# Patient Record
Sex: MALE | Race: WHITE | NOT HISPANIC OR LATINO | Employment: FULL TIME | URBAN - METROPOLITAN AREA
[De-identification: names, ages, dates, MRNs, and addresses within clinical notes are randomized per-mention and may not be internally consistent; named-entity substitution may affect disease eponyms.]

---

## 2024-10-24 ENCOUNTER — OFFICE VISIT (OUTPATIENT)
Dept: URGENT CARE | Facility: CLINIC | Age: 24
End: 2024-10-24
Payer: COMMERCIAL

## 2024-10-24 ENCOUNTER — APPOINTMENT (OUTPATIENT)
Dept: RADIOLOGY | Facility: CLINIC | Age: 24
End: 2024-10-24
Payer: COMMERCIAL

## 2024-10-24 VITALS
BODY MASS INDEX: 28.25 KG/M2 | WEIGHT: 186.4 LBS | HEIGHT: 68 IN | TEMPERATURE: 98 F | RESPIRATION RATE: 18 BRPM | OXYGEN SATURATION: 98 % | HEART RATE: 78 BPM

## 2024-10-24 DIAGNOSIS — S69.91XA INJURY OF RIGHT INDEX FINGER, INITIAL ENCOUNTER: ICD-10-CM

## 2024-10-24 DIAGNOSIS — S63.690A OTHER SPRAIN OF RIGHT INDEX FINGER, INITIAL ENCOUNTER: Primary | ICD-10-CM

## 2024-10-24 PROCEDURE — 73140 X-RAY EXAM OF FINGER(S): CPT

## 2024-10-24 PROCEDURE — G0382 LEV 3 HOSP TYPE B ED VISIT: HCPCS | Performed by: ORTHOPAEDIC SURGERY

## 2024-10-24 NOTE — PATIENT INSTRUCTIONS
Cornelius tape when active  Pain relief:   Tylenol   Motrin   Ice  Referral to orthopedics and PT provided if symptoms fail to improve  Follow up with PCP in 3-5 days.  Proceed to  ER if symptoms worsen.

## 2024-10-24 NOTE — PROGRESS NOTES
[facility]        NAME: Jaswant Saldaña is a 24 y.o. male  : 2000    MRN: [id number]  DATE: 2024  TIME: 3:30 PM    Assessment and Plan   Other sprain of right index finger, initial encounter [S63.690A]  1. Other sprain of right index finger, initial encounter  Ambulatory Referral to Orthopedic Surgery    Ambulatory Referral to Physical Therapy      2. Injury of right index finger, initial encounter  XR finger right second digit-index        XR of the right index finger reviewed and discussed with the patient, which shows no evidence of fracture or dislocation.     History and exam consistent with a sprain of the radial collateral ligament. Provided cornelius tape, advised patient to follow up with orthopedics and/or PT if symptoms fail to improve.     Patient Instructions     Cornelius tape when active  Pain relief:   Tylenol   Motrin   Ice  Referral to orthopedics and PT provided if symptoms fail to improve  Follow up with PCP in 3-5 days.  Proceed to  ER if symptoms worsen.    If tests are performed, our office will contact you with results only if changes need to made to the care plan discussed with you at the visit. You can review your full results on [facility].    Chief Complaint     Chief Complaint   Patient presents with    Finger Injury     Injured his Right pointer finger while cracking his knuckles happened a month ago          History of Present Illness       24 YOM presents to the urgent care for evaluation of right index finger pain. The pain started about a month ago after he cracked his knuckle. He complains of pain along the radial aspect of the PIP joint that occurs if he bends the finger a certain way. He denies any numbness or tingling.         Review of Systems   Review of Systems   Constitutional:  Negative for chills and fever.   HENT:  Negative for ear pain and sore throat.    Eyes:  Negative for pain and visual disturbance.   Respiratory:  Negative for cough and  "shortness of breath.    Cardiovascular:  Negative for chest pain and palpitations.   Gastrointestinal:  Negative for abdominal pain, diarrhea, nausea and vomiting.   Genitourinary:  Negative for dysuria and hematuria.   Musculoskeletal:  Positive for arthralgias. Negative for back pain.   Skin:  Negative for color change and rash.   Neurological:  Negative for dizziness, seizures, syncope, light-headedness and headaches.   Psychiatric/Behavioral: Negative.     All other systems reviewed and are negative.        Current Medications     No current outpatient medications on file.    Current Allergies     Allergies as of 10/24/2024    (No Known Allergies)            The following portions of the patient's history were reviewed and updated as appropriate: allergies, current medications, past family history, past medical history, past social history, past surgical history and problem list.     History reviewed. No pertinent past medical history.    History reviewed. No pertinent surgical history.    No family history on file.      Medications have been verified.        Objective   Pulse 78   Temp 98 °F (36.7 °C)   Resp 18   Ht 5' 8\" (1.727 m)   Wt 84.6 kg (186 lb 6.4 oz)   SpO2 98%   BMI 28.34 kg/m²        Physical Exam     Physical Exam  Vitals and nursing note reviewed.   Constitutional:       General: He is not in acute distress.     Appearance: Normal appearance. He is not ill-appearing.   HENT:      Head: Normocephalic and atraumatic.      Nose: Nose normal.      Mouth/Throat:      Mouth: Mucous membranes are moist.      Pharynx: Oropharynx is clear.   Eyes:      Extraocular Movements: Extraocular movements intact.      Pupils: Pupils are equal, round, and reactive to light.   Cardiovascular:      Rate and Rhythm: Normal rate.      Pulses: Normal pulses.   Pulmonary:      Effort: Pulmonary effort is normal. No respiratory distress.   Musculoskeletal:      Cervical back: Normal range of motion.      Comments: " Right index finger:  Skin without lesions, ecchymosis, erythema, swelling, warmth. There is tenderness along the radial aspect of the PIP joint. Full ROM of the DIP, PIP, and MCP joints, patient able to make a full composite fist. The PIP joint is stable to radial and ulnar deviation without laxity. There is pain elicited with radial collateral ligament stress on ulnar deviation. Neurovascularly intact, brisk cap refill.    Skin:     General: Skin is warm and dry.      Capillary Refill: Capillary refill takes less than 2 seconds.   Neurological:      General: No focal deficit present.      Mental Status: He is alert and oriented to person, place, and time.   Psychiatric:         Mood and Affect: Mood normal.         Behavior: Behavior normal.

## 2025-01-23 ENCOUNTER — OFFICE VISIT (OUTPATIENT)
Dept: URGENT CARE | Facility: CLINIC | Age: 25
End: 2025-01-23
Payer: COMMERCIAL

## 2025-01-23 ENCOUNTER — APPOINTMENT (OUTPATIENT)
Dept: RADIOLOGY | Facility: CLINIC | Age: 25
End: 2025-01-23
Payer: COMMERCIAL

## 2025-01-23 VITALS
HEIGHT: 68 IN | DIASTOLIC BLOOD PRESSURE: 68 MMHG | SYSTOLIC BLOOD PRESSURE: 130 MMHG | BODY MASS INDEX: 27.58 KG/M2 | HEART RATE: 93 BPM | OXYGEN SATURATION: 96 % | WEIGHT: 182 LBS | RESPIRATION RATE: 18 BRPM | TEMPERATURE: 99.3 F

## 2025-01-23 DIAGNOSIS — S82.55XA NONDISPLACED FRACTURE OF MEDIAL MALLEOLUS OF LEFT TIBIA, INITIAL ENCOUNTER FOR CLOSED FRACTURE: Primary | ICD-10-CM

## 2025-01-23 DIAGNOSIS — S99.912A LEFT ANKLE INJURY, INITIAL ENCOUNTER: ICD-10-CM

## 2025-01-23 PROCEDURE — G0382 LEV 3 HOSP TYPE B ED VISIT: HCPCS | Performed by: ORTHOPAEDIC SURGERY

## 2025-01-23 PROCEDURE — 29515 APPLICATION SHORT LEG SPLINT: CPT

## 2025-01-23 PROCEDURE — 73610 X-RAY EXAM OF ANKLE: CPT

## 2025-01-24 ENCOUNTER — OFFICE VISIT (OUTPATIENT)
Dept: OBGYN CLINIC | Facility: CLINIC | Age: 25
End: 2025-01-24
Payer: COMMERCIAL

## 2025-01-24 VITALS — WEIGHT: 182 LBS | BODY MASS INDEX: 27.58 KG/M2 | HEIGHT: 68 IN

## 2025-01-24 DIAGNOSIS — S99.912A LEFT ANKLE INJURY, INITIAL ENCOUNTER: ICD-10-CM

## 2025-01-24 DIAGNOSIS — S82.55XA NONDISPLACED FRACTURE OF MEDIAL MALLEOLUS OF LEFT TIBIA, INITIAL ENCOUNTER FOR CLOSED FRACTURE: ICD-10-CM

## 2025-01-24 PROCEDURE — 99203 OFFICE O/P NEW LOW 30 MIN: CPT | Performed by: ORTHOPAEDIC SURGERY

## 2025-01-24 NOTE — PROGRESS NOTES
[facility]        NAME: Jaswant Saldaña is a 24 y.o. male  : 2000    MRN: [id number]  DATE: 2025  TIME: 9:23 AM    Assessment and Plan   Nondisplaced fracture of medial malleolus of left tibia, initial encounter for closed fracture [S82.55XA]  1. Nondisplaced fracture of medial malleolus of left tibia, initial encounter for closed fracture  Ambulatory Referral to Orthopedic Surgery    Orthopedic injury treatment      2. Left ankle injury, initial encounter  XR ankle 3+ vw left    Ambulatory Referral to Orthopedic Surgery        XR of the left ankle reviewed and discussed with the patient, which does show a possible deformity along the medial malleolus extending to the joint space. Will treat as fracture. Patient fitted with a splint today as well as crutches. Referred to orthopedics for further evaluation.     Patient Instructions     Keep splint clean, dry, and intact  Non-weight bearing on the left lower extremity, crutches for assistance   Pain relief:   Tylenol   Motrin   Ice   Elevation   Follow up with orthopedics within one week. Please call the [facility] scheduling office at [phone] to schedule your appointment.     If tests are performed, our office will contact you with results only if changes need to made to the care plan discussed with you at the visit. You can review your full results on [facility].    Chief Complaint     Chief Complaint   Patient presents with    Ankle Injury     Left ankle twisted while snow board and fell.          History of Present Illness       24 YOM presents to the urgent care for evaluation of a left ankle injury sustained this evening while snowboarding. He states that the front of his board caught on the snow and he fell, twisting his ankle. He notes pain both medially and laterally. No numbness or tingling. He has never injured the ankle before. The pain worsens with weight bearing and movement of the ankle.  "        Review of Systems   Review of Systems   Constitutional:  Negative for chills and fever.   HENT:  Negative for ear pain and sore throat.    Eyes:  Negative for pain and visual disturbance.   Respiratory:  Negative for cough and shortness of breath.    Cardiovascular:  Negative for chest pain and palpitations.   Gastrointestinal:  Negative for abdominal pain and vomiting.   Genitourinary:  Negative for dysuria and hematuria.   Musculoskeletal:  Positive for arthralgias, gait problem, joint swelling and myalgias. Negative for back pain.   Skin:  Negative for color change and rash.   Neurological:  Negative for seizures and syncope.   All other systems reviewed and are negative.        Current Medications     No current outpatient medications on file.    Current Allergies     Allergies as of 01/23/2025    (No Known Allergies)            The following portions of the patient's history were reviewed and updated as appropriate: allergies, current medications, past family history, past medical history, past social history, past surgical history and problem list.     History reviewed. No pertinent past medical history.    History reviewed. No pertinent surgical history.    History reviewed. No pertinent family history.      Medications have been verified.        Objective   /68   Pulse 93   Temp 99.3 °F (37.4 °C)   Resp 18   Ht 5' 8\" (1.727 m)   Wt 82.6 kg (182 lb)   SpO2 96%   BMI 27.67 kg/m²        Physical Exam     Physical Exam  Vitals and nursing note reviewed.   Constitutional:       General: He is not in acute distress.     Appearance: Normal appearance. He is not ill-appearing.   HENT:      Head: Normocephalic and atraumatic.      Nose: Nose normal.      Mouth/Throat:      Mouth: Mucous membranes are moist.      Pharynx: Oropharynx is clear.   Eyes:      Extraocular Movements: Extraocular movements intact.      Pupils: Pupils are equal, round, and reactive to light.   Cardiovascular:      Rate " "and Rhythm: Normal rate.      Pulses: Normal pulses.   Pulmonary:      Effort: Pulmonary effort is normal. No respiratory distress.   Musculoskeletal:      Cervical back: Normal range of motion.      Comments: Left ankle:  Patient presents in a wheelchair due to inability to bear weight. No lesions, ecchymosis, erythema. Mild soft tissue swelling throughout the ankle. Tenderness present along the bony medial malleolus as well as along the deltoid ligament. No tenderness laterally. Active ankle ROM limited due to pain. Sensation intact to light touch along the DP/SP/SA/ASIF/T nerve distributions. Strong pedal pulse and soft lower extremity compartments.    Skin:     General: Skin is warm and dry.      Capillary Refill: Capillary refill takes less than 2 seconds.   Neurological:      General: No focal deficit present.      Mental Status: He is alert and oriented to person, place, and time.   Psychiatric:         Mood and Affect: Mood normal.         Behavior: Behavior normal.         Orthopedic injury treatment    Date/Time: 1/23/2025 6:00 PM    Performed by: Nilsa Reyes PA-C  Authorized by: Nilsa Reyes PA-C    Patient Location:  [facility]  [place] Protocol:  procedure performed by consultantConsent: Verbal consent obtained.  Risks and benefits: risks, benefits and alternatives were discussed  Consent given by: patient  Time out: Immediately prior to procedure a \"time out\" was called to verify the correct patient, procedure, equipment, support staff and site/side marked as required.  Timeout called at: 1/23/2025 6:00 PM.  Patient understanding: patient states understanding of the procedure being performed  Radiology Images displayed and confirmed. If images not available, report reviewed: imaging studies available  Patient identity confirmed: verbally with patient    Injury location:  Ankle  Location details:  Left ankle  Injury type:  Fracture  Fracture type: medial malleolus fracture    Neurovascular status: " Neurovascularly intact    Distal perfusion: normal    Neurological function: normal    Range of motion: reduced    Manipulation performed?: No    Immobilization:  Splint  Splint type: Posterior slab with U splint.  Supplies used:  Cotton padding and Ortho-Glass  Distal perfusion: normal    Neurological function: normal    Patient tolerance:  Patient tolerated the procedure well with no immediate complications

## 2025-01-24 NOTE — PATIENT INSTRUCTIONS
Keep splint clean, dry, and intact  Non-weight bearing on the left lower extremity, crutches for assistance   Pain relief:   Tylenol   Motrin   Ice   Elevation   Follow up with orthopedics within one week. Please call the [facility] Orthopedic scheduling office at [phone] to schedule your appointment.

## 2025-01-24 NOTE — PROGRESS NOTES
Assessment/Plan:  Assessment & Plan   Diagnoses and all orders for this visit:    Left ankle injury, initial encounter  -     Ambulatory Referral to Orthopedic Surgery    Nondisplaced fracture of medial malleolus of left tibia, initial encounter for closed fracture  -     Ambulatory Referral to Orthopedic Surgery  -     Cam Boot  -     CT lower extremity wo contrast left; Future        A CT was ordered of his ankle.  The AP view looks nondisplaced.  However on oblique looks more displaced versus positional.  Would recommend obtaining the CT for clarification.  He was prescribed a boot.  Return back once CT is complete    Subjective:   Patient ID: Jaswant Saldaña is a 24 y.o. male.    HPI    The patient injured his left ankle yesterday while snowboarding at [employer].  He states majority the pain is along the medial side of his ankle.  He denies any numbness or tingling.  He denies any fever or chills    The following portions of the patient's history were reviewed and updated as appropriate: allergies, current medications, past family history, past medical history, past social history, past surgical history, and problem list.    Review of Systems   Constitutional:  Negative for chills, fever and unexpected weight change.   HENT:  Negative for hearing loss, nosebleeds and sore throat.    Eyes:  Negative for pain, redness and visual disturbance.   Respiratory:  Negative for cough, shortness of breath and wheezing.    Cardiovascular:  Negative for chest pain, palpitations and leg swelling.   Gastrointestinal:  Negative for abdominal pain, nausea and vomiting.   Endocrine: Negative for polydipsia and polyuria.   Genitourinary:  Negative for dysuria and hematuria.   Musculoskeletal:  Positive for gait problem, joint swelling and myalgias. Negative for arthralgias, back pain, neck pain and neck stiffness.        As noted in HPI   Skin:  Negative for rash and wound.   Neurological:  Negative for dizziness, numbness and  headaches.   Psychiatric/Behavioral:  Negative for decreased concentration and suicidal ideas. The patient is not nervous/anxious.        Objective:  Ortho Exam    Left lower extremity is neurovascularly intact.  Toes are pink and mobile.  Compartments are soft.  There is no pain along the lateral side.  There is pain with some swelling on the medial side.  No syndesmotic pain.    I have personally reviewed pertinent films in PACS.    X-rays on the AP view show a possibly nondisplaced fracture of the medial malleoli.  The oblique film shows a possibly more of a displaced fracture but unclear due to position.  The x-ray machine was malfunctioning today and updated films cannot be obtained

## 2025-01-27 ENCOUNTER — TELEPHONE (OUTPATIENT)
Age: 25
End: 2025-01-27

## 2025-01-27 NOTE — TELEPHONE ENCOUNTER
"Caller: patient    Doctor: fabio    Reason for call:following inbasket was [facility] to [facility]    \"Good afternoon,    Patient is following up on status of authorization for CT scan that is scheduled for 1/30/2025. I advised patient that as of this morning it was still pending but it was submitted. Patient asked for us to reach out again to check the status and to contact them back directly with and update. Phone number is [phone]\"    Call back#: n.a  "

## 2025-01-28 ENCOUNTER — TELEPHONE (OUTPATIENT)
Dept: OBGYN CLINIC | Facility: HOSPITAL | Age: 25
End: 2025-01-28

## 2025-01-28 NOTE — TELEPHONE ENCOUNTER
RESPONSE: 1/28 8:11 AM     Celina Johns    Placed call to patient at [phone] and left message requesting a call back regarding upcoming diagnostic study scheduled for 01/30.

## 2025-01-28 NOTE — TELEPHONE ENCOUNTER
Caller: Pt     Doctor: Clay    Reason for call: requesting CT script to be faxed to two locations:    [facility]- [phone]     [facility]- [phone]      Mom would also like to know if he should be seen again for repeat xrs. No new injury. Mom states swelling is going down slowly    Call back#: Phone:   [phone]

## 2025-01-29 ENCOUNTER — TELEPHONE (OUTPATIENT)
Dept: OBGYN CLINIC | Facility: CLINIC | Age: 25
End: 2025-01-29

## 2025-01-29 NOTE — TELEPHONE ENCOUNTER
Caller: Jaswant     Doctor: Clay Bowers     Reason for call: Patient seen for left ankle injury on 1/24.    Patient given orders for CT Scan    Please fax orders to number below     [facility]     [address] / [place]     Fax : [phone]    Please call patient when completed so he can schedule appointment     Call back#: [phone]

## 2025-01-29 NOTE — TELEPHONE ENCOUNTER
Caller: Iveth, Patient Mom     Doctor: Clay    Reason for call: Iveth is calling in regards to CT scan. She states she was not able to schedule anywhere externally due to insurance and the soonest CT they could schedule in network was for 2/3. Iveth is very concerned that this is too much time in between the injury date and care. She is asking if more xrays can be taken instead, so to not delay care for the patient as he has been in pain, limping a lot and missing work. Please advise.     Call back#: [phone] (Iveth, Patient Mom) OR   [phone] (Patient)

## 2025-01-29 NOTE — TELEPHONE ENCOUNTER
Patient has changed his plans and will schedule CT Scan with [facility].    Doxycycline Counseling:  Patient counseled regarding possible photosensitivity and increased risk for sunburn.  Patient instructed to avoid sunlight, if possible.  When exposed to sunlight, patients should wear protective clothing, sunglasses, and sunscreen.  The patient was instructed to call the office immediately if the following severe adverse effects occur:  hearing changes, easy bruising/bleeding, severe headache, or vision changes.  The patient verbalized understanding of the proper use and possible adverse effects of doxycycline.  All of the patient's questions and concerns were addressed.

## 2025-02-03 NOTE — TELEPHONE ENCOUNTER
I will follow up with patient regarding his CT scan and assist in scheduling.   Below auth information that delayed imaging.    01/31/2025 11:55 AM Celina Hurt Estimate has been received from price checkers. -   Note:  Estimate has been received from [employer].   Cleared for DOS 02/03/2025.  .  Type Date User Summary Attachment   General 01/31/2025  9:19 AM Celina Hurt Patient is now rescheduled for [facility] on 02/03/2025. -   Note:  Patient is now rescheduled for [facility] on 02/03/2025.   Per his insurance, [facility] was denied as site of care as they prefer a freestanding facility.   Account marked as self pay.   .

## 2025-02-03 NOTE — TELEPHONE ENCOUNTER
Dr Williamson, noted in chart that pt was scheduled for CT scan today; however, today it is listed as ordered again and not scheduled. Just an FYI.

## 2025-02-04 NOTE — TELEPHONE ENCOUNTER
Additional VM left for patient regarding scheduling the CT scan and follow up appointment with Dr Williamson. Adelaida provided my direct number for a direct return call. Per his needs to have imaging preformed at a free standing facility.

## 2025-07-09 ENCOUNTER — APPOINTMENT (OUTPATIENT)
Dept: URGENT CARE | Facility: CLINIC | Age: 25
End: 2025-07-09